# Patient Record
(demographics unavailable — no encounter records)

---

## 2025-04-22 NOTE — PHYSICAL EXAM
[Right] : right knee [NL (0)] : extension 0 degrees [5___] : hamstring 5[unfilled]/5 [Positive] : positive Ronel [] : patient ambulates without assistive device [TWNoteComboBox7] : flexion 130 degrees

## 2025-04-22 NOTE — IMAGING
[Right] : right knee [All Views] : anteroposterior, lateral, skyline, and anteroposterior standing [There are no fractures, subluxations or dislocations. No significant abnormalities are seen] : There are no fractures, subluxations or dislocations. No significant abnormalities are seen [FreeTextEntry9] : independent interpretation on 04/22/2025

## 2025-04-22 NOTE — DISCUSSION/SUMMARY
[de-identified] : 58yo female presents with lateral right knee pain after twisting injury in june 2024, pain improved but worsened over the last 2 weeks. +mcmurrays on exam The patient was advised of the diagnosis. The natural history of the pathology was explained in full to the patient in layman's terms. All questions were answered.  1) MRI right knee, eval meniscal tear 2) Discussed possible arthroscopic repair depending on MRI results.  3) Use of cryotherapy discussed 4) No risky activity in the interim  4) RTC after MRI to review results

## 2025-04-22 NOTE — HISTORY OF PRESENT ILLNESS
[de-identified] : 04/22/2025 Ms. GAVIN CAMILO, a 59 year old female, presents today for right knee pain. H/O right knee injury in June 2024 after getting up and twisting her right thigh which she has had pain since. Pain increased 2 weeks ago after she sat her entire day at work and realized she was having issues going down the stairs. Denies n/t. Notes throbbing, swelling and sense of weakness. Pain radiates throughout her right patella but centers on the anterior side. Has tried wrapping and icing w/ temp relief. No formal treatment to date.

## 2025-05-01 NOTE — HISTORY OF PRESENT ILLNESS
[FreeTextEntry5] : 04.29.25: Patient is following up on the Rt knee. Pain is  . Pt is here today to go over MRI images.  [] : yes [de-identified] : 04/29/2025: Here to f/u R knee pain and review MRI. Describes continued pain over the anterior / lateral aspect of the knee.  04/22/2025 Ms. GAVIN CAMILO, a 59 year old female, presents today for right knee pain. H/O right knee injury in June 2024 after getting up and twisting her right thigh which she has had pain since. Pain increased 2 weeks ago after she sat her entire day at work and realized she was having issues going down the stairs. Denies n/t. Notes throbbing, swelling and sense of weakness. Pain radiates throughout her right patella but centers on the anterior side. Has tried wrapping and icing w/ temp relief. No formal treatment to date.

## 2025-05-01 NOTE — DISCUSSION/SUMMARY
[de-identified] : 59f with MRI of the right knee showing complex discoid lmt with parameniscal cyst, medial meniscal degeneration without tear and with small loose bodies, mild chronic ACL sprain and djd.  The MRI results and images were reviewed with the patient. The patient was advised of the diagnosis. The natural history of the pathology was explained in full to the patient in layman's terms. All questions were answered.  Discussed possible arthroscopic meniscectomy. r/b/a of surgical intervention and conservative management discussed. pt given to opportunity to ask all questions and all questions were answered. Will start with conservative treatment.   1) rx provided for aspiration parameniscal cyst 2) cryotherapy, rest and activity modification  3) rtc 2 weeks after aspiration, if no improvement will further discuss surgical intervention   Entered by Alena Schulz acting as scribe. Dr. Tiwari- The documentation recorded by the scribe accurately reflects the service I personally performed and the decisions made by me.  initiate hand expression routine

## 2025-05-01 NOTE — DATA REVIEWED
[MRI] : MRI [Right] : of the right [Knee] : knee [Report was reviewed and noted in the chart] : The report was reviewed and noted in the chart [I independently reviewed and interpreted images and report] : I independently reviewed and interpreted images and report [I reviewed the films/CD] : I reviewed the films/CD [FreeTextEntry1] : 4/23/25 [facility] - independent interpretation on 04/29/2025: complex discoid lmt with parameniscal cyst, medial meniscal degeneration without tear and with small loose bodies, mild chronic ACL sprain and djd.

## 2025-05-01 NOTE — DISCUSSION/SUMMARY
[de-identified] : 59f with MRI of the right knee showing complex discoid lmt with parameniscal cyst, medial meniscal degeneration without tear and with small loose bodies, mild chronic ACL sprain and djd.  The MRI results and images were reviewed with the patient. The patient was advised of the diagnosis. The natural history of the pathology was explained in full to the patient in layman's terms. All questions were answered.  Discussed possible arthroscopic meniscectomy. r/b/a of surgical intervention and conservative management discussed. pt given to opportunity to ask all questions and all questions were answered. Will start with conservative treatment.   1) rx provided for aspiration parameniscal cyst 2) cryotherapy, rest and activity modification  3) rtc 2 weeks after aspiration, if no improvement will further discuss surgical intervention   Entered by Alena Schulz acting as scribe. Dr. Tiwari- The documentation recorded by the scribe accurately reflects the service I personally performed and the decisions made by me.

## 2025-05-01 NOTE — DISCUSSION/SUMMARY
[de-identified] : 59f with MRI of the right knee showing complex discoid lmt with parameniscal cyst, medial meniscal degeneration without tear and with small loose bodies, mild chronic ACL sprain and djd.  The MRI results and images were reviewed with the patient. The patient was advised of the diagnosis. The natural history of the pathology was explained in full to the patient in layman's terms. All questions were answered.  Discussed possible arthroscopic meniscectomy. r/b/a of surgical intervention and conservative management discussed. pt given to opportunity to ask all questions and all questions were answered. Will start with conservative treatment.   1) rx provided for aspiration parameniscal cyst 2) cryotherapy, rest and activity modification  3) rtc 2 weeks after aspiration, if no improvement will further discuss surgical intervention   Entered by Alena Schulz acting as scribe. Dr. Tiwari- The documentation recorded by the scribe accurately reflects the service I personally performed and the decisions made by me.

## 2025-05-01 NOTE — HISTORY OF PRESENT ILLNESS
[FreeTextEntry5] : 04.29.25: Patient is following up on the Rt knee. Pain is  . Pt is here today to go over MRI images.  [] : yes [de-identified] : 04/29/2025: Here to f/u R knee pain and review MRI. Describes continued pain over the anterior / lateral aspect of the knee.  04/22/2025 Ms. GAVIN CAMILO, a 59 year old female, presents today for right knee pain. H/O right knee injury in June 2024 after getting up and twisting her right thigh which she has had pain since. Pain increased 2 weeks ago after she sat her entire day at work and realized she was having issues going down the stairs. Denies n/t. Notes throbbing, swelling and sense of weakness. Pain radiates throughout her right patella but centers on the anterior side. Has tried wrapping and icing w/ temp relief. No formal treatment to date.

## 2025-05-01 NOTE — HISTORY OF PRESENT ILLNESS
[FreeTextEntry5] : 04.29.25: Patient is following up on the Rt knee. Pain is  . Pt is here today to go over MRI images.  [] : yes [de-identified] : 04/29/2025: Here to f/u R knee pain and review MRI. Describes continued pain over the anterior / lateral aspect of the knee.  04/22/2025 Ms. GAVIN CAMILO, a 59 year old female, presents today for right knee pain. H/O right knee injury in June 2024 after getting up and twisting her right thigh which she has had pain since. Pain increased 2 weeks ago after she sat her entire day at work and realized she was having issues going down the stairs. Denies n/t. Notes throbbing, swelling and sense of weakness. Pain radiates throughout her right patella but centers on the anterior side. Has tried wrapping and icing w/ temp relief. No formal treatment to date.

## 2025-05-01 NOTE — HISTORY OF PRESENT ILLNESS
[FreeTextEntry5] : 04.29.25: Patient is following up on the Rt knee. Pain is  . Pt is here today to go over MRI images.  [] : yes [de-identified] : 04/29/2025: Here to f/u R knee pain and review MRI. Describes continued pain over the anterior / lateral aspect of the knee.  04/22/2025 Ms. GAVIN CAMILO, a 59 year old female, presents today for right knee pain. H/O right knee injury in June 2024 after getting up and twisting her right thigh which she has had pain since. Pain increased 2 weeks ago after she sat her entire day at work and realized she was having issues going down the stairs. Denies n/t. Notes throbbing, swelling and sense of weakness. Pain radiates throughout her right patella but centers on the anterior side. Has tried wrapping and icing w/ temp relief. No formal treatment to date.

## 2025-05-01 NOTE — DISCUSSION/SUMMARY
[de-identified] : 59f with MRI of the right knee showing complex discoid lmt with parameniscal cyst, medial meniscal degeneration without tear and with small loose bodies, mild chronic ACL sprain and djd.  The MRI results and images were reviewed with the patient. The patient was advised of the diagnosis. The natural history of the pathology was explained in full to the patient in layman's terms. All questions were answered.  Discussed possible arthroscopic meniscectomy. r/b/a of surgical intervention and conservative management discussed. pt given to opportunity to ask all questions and all questions were answered. Will start with conservative treatment.   1) rx provided for aspiration parameniscal cyst 2) cryotherapy, rest and activity modification  3) rtc 2 weeks after aspiration, if no improvement will further discuss surgical intervention   Entered by Alena Schulz acting as scribe. Dr. Tiwari- The documentation recorded by the scribe accurately reflects the service I personally performed and the decisions made by me.

## 2025-05-01 NOTE — PHYSICAL EXAM
[Right] : right knee [NL (0)] : extension 0 degrees [5___] : hamstring 5[unfilled]/5 [Positive] : positive Ronel [] : negative Valgus instability [TWNoteComboBox7] : flexion 130 degrees